# Patient Record
Sex: MALE | Race: WHITE | NOT HISPANIC OR LATINO | Employment: UNEMPLOYED | URBAN - METROPOLITAN AREA
[De-identification: names, ages, dates, MRNs, and addresses within clinical notes are randomized per-mention and may not be internally consistent; named-entity substitution may affect disease eponyms.]

---

## 2023-10-06 ENCOUNTER — LAB VISIT (OUTPATIENT)
Dept: LAB | Facility: HOSPITAL | Age: 28
End: 2023-10-06
Attending: STUDENT IN AN ORGANIZED HEALTH CARE EDUCATION/TRAINING PROGRAM
Payer: COMMERCIAL

## 2023-10-06 ENCOUNTER — OFFICE VISIT (OUTPATIENT)
Dept: HEMATOLOGY/ONCOLOGY | Facility: CLINIC | Age: 28
End: 2023-10-06
Payer: COMMERCIAL

## 2023-10-06 VITALS
HEIGHT: 69 IN | SYSTOLIC BLOOD PRESSURE: 136 MMHG | WEIGHT: 199.06 LBS | DIASTOLIC BLOOD PRESSURE: 73 MMHG | OXYGEN SATURATION: 98 % | BODY MASS INDEX: 29.48 KG/M2 | HEART RATE: 71 BPM

## 2023-10-06 DIAGNOSIS — E66.3 OVERWEIGHT (BMI 25.0-29.9): ICD-10-CM

## 2023-10-06 DIAGNOSIS — Z76.89 ENCOUNTER TO ESTABLISH CARE: ICD-10-CM

## 2023-10-06 DIAGNOSIS — D64.9 ANEMIA, UNSPECIFIED TYPE: ICD-10-CM

## 2023-10-06 DIAGNOSIS — D69.6 THROMBOCYTOPENIA: ICD-10-CM

## 2023-10-06 DIAGNOSIS — D69.6 THROMBOCYTOPENIA: Primary | ICD-10-CM

## 2023-10-06 LAB
BASOPHILS # BLD AUTO: 0.03 K/UL (ref 0–0.2)
BASOPHILS NFR BLD: 0.5 % (ref 0–1.9)
DIFFERENTIAL METHOD: ABNORMAL
EOSINOPHIL # BLD AUTO: 0 K/UL (ref 0–0.5)
EOSINOPHIL NFR BLD: 0.7 % (ref 0–8)
ERYTHROCYTE [DISTWIDTH] IN BLOOD BY AUTOMATED COUNT: 12.8 % (ref 11.5–14.5)
ERYTHROCYTE [SEDIMENTATION RATE] IN BLOOD BY WESTERGREN METHOD: 11 MM/HR (ref 0–10)
HCT VFR BLD AUTO: 40.3 % (ref 40–54)
HGB BLD-MCNC: 13.4 G/DL (ref 14–18)
IMM GRANULOCYTES # BLD AUTO: 0.02 K/UL (ref 0–0.04)
IMM GRANULOCYTES NFR BLD AUTO: 0.3 % (ref 0–0.5)
LYMPHOCYTES # BLD AUTO: 1.9 K/UL (ref 1–4.8)
LYMPHOCYTES NFR BLD: 32.3 % (ref 18–48)
MCH RBC QN AUTO: 28.3 PG (ref 27–31)
MCHC RBC AUTO-ENTMCNC: 33.3 G/DL (ref 32–36)
MCV RBC AUTO: 85 FL (ref 82–98)
MONOCYTES # BLD AUTO: 0.4 K/UL (ref 0.3–1)
MONOCYTES NFR BLD: 6.6 % (ref 4–15)
NEUTROPHILS # BLD AUTO: 3.5 K/UL (ref 1.8–7.7)
NEUTROPHILS NFR BLD: 59.6 % (ref 38–73)
NRBC BLD-RTO: 0 /100 WBC
PLATELET # BLD AUTO: 127 K/UL (ref 150–450)
PMV BLD AUTO: 12.7 FL (ref 9.2–12.9)
RBC # BLD AUTO: 4.73 M/UL (ref 4.6–6.2)
RETICS/RBC NFR AUTO: 1.1 % (ref 0.4–2)
WBC # BLD AUTO: 5.91 K/UL (ref 3.9–12.7)

## 2023-10-06 PROCEDURE — 99999 PR PBB SHADOW E&M-EST. PATIENT-LVL IV: ICD-10-PCS | Mod: PBBFAC,,, | Performed by: STUDENT IN AN ORGANIZED HEALTH CARE EDUCATION/TRAINING PROGRAM

## 2023-10-06 PROCEDURE — 84630 ASSAY OF ZINC: CPT | Performed by: STUDENT IN AN ORGANIZED HEALTH CARE EDUCATION/TRAINING PROGRAM

## 2023-10-06 PROCEDURE — 85025 COMPLETE CBC W/AUTO DIFF WBC: CPT | Performed by: STUDENT IN AN ORGANIZED HEALTH CARE EDUCATION/TRAINING PROGRAM

## 2023-10-06 PROCEDURE — 3008F PR BODY MASS INDEX (BMI) DOCUMENTED: ICD-10-PCS | Mod: CPTII,S$GLB,, | Performed by: STUDENT IN AN ORGANIZED HEALTH CARE EDUCATION/TRAINING PROGRAM

## 2023-10-06 PROCEDURE — 3078F PR MOST RECENT DIASTOLIC BLOOD PRESSURE < 80 MM HG: ICD-10-PCS | Mod: CPTII,S$GLB,, | Performed by: STUDENT IN AN ORGANIZED HEALTH CARE EDUCATION/TRAINING PROGRAM

## 2023-10-06 PROCEDURE — 83020 HEMOGLOBIN ELECTROPHORESIS: CPT | Performed by: STUDENT IN AN ORGANIZED HEALTH CARE EDUCATION/TRAINING PROGRAM

## 2023-10-06 PROCEDURE — 1159F PR MEDICATION LIST DOCUMENTED IN MEDICAL RECORD: ICD-10-PCS | Mod: CPTII,S$GLB,, | Performed by: STUDENT IN AN ORGANIZED HEALTH CARE EDUCATION/TRAINING PROGRAM

## 2023-10-06 PROCEDURE — 1159F MED LIST DOCD IN RCRD: CPT | Mod: CPTII,S$GLB,, | Performed by: STUDENT IN AN ORGANIZED HEALTH CARE EDUCATION/TRAINING PROGRAM

## 2023-10-06 PROCEDURE — 99999 PR PBB SHADOW E&M-EST. PATIENT-LVL IV: CPT | Mod: PBBFAC,,, | Performed by: STUDENT IN AN ORGANIZED HEALTH CARE EDUCATION/TRAINING PROGRAM

## 2023-10-06 PROCEDURE — 87340 HEPATITIS B SURFACE AG IA: CPT | Performed by: STUDENT IN AN ORGANIZED HEALTH CARE EDUCATION/TRAINING PROGRAM

## 2023-10-06 PROCEDURE — 3075F PR MOST RECENT SYSTOLIC BLOOD PRESS GE 130-139MM HG: ICD-10-PCS | Mod: CPTII,S$GLB,, | Performed by: STUDENT IN AN ORGANIZED HEALTH CARE EDUCATION/TRAINING PROGRAM

## 2023-10-06 PROCEDURE — 85045 AUTOMATED RETICULOCYTE COUNT: CPT | Performed by: STUDENT IN AN ORGANIZED HEALTH CARE EDUCATION/TRAINING PROGRAM

## 2023-10-06 PROCEDURE — 3078F DIAST BP <80 MM HG: CPT | Mod: CPTII,S$GLB,, | Performed by: STUDENT IN AN ORGANIZED HEALTH CARE EDUCATION/TRAINING PROGRAM

## 2023-10-06 PROCEDURE — 81269 HBA1/HBA2 GENE DUP/DEL VRNTS: CPT | Performed by: STUDENT IN AN ORGANIZED HEALTH CARE EDUCATION/TRAINING PROGRAM

## 2023-10-06 PROCEDURE — 85652 RBC SED RATE AUTOMATED: CPT | Performed by: STUDENT IN AN ORGANIZED HEALTH CARE EDUCATION/TRAINING PROGRAM

## 2023-10-06 PROCEDURE — 99205 OFFICE O/P NEW HI 60 MIN: CPT | Mod: S$GLB,,, | Performed by: STUDENT IN AN ORGANIZED HEALTH CARE EDUCATION/TRAINING PROGRAM

## 2023-10-06 PROCEDURE — 3075F SYST BP GE 130 - 139MM HG: CPT | Mod: CPTII,S$GLB,, | Performed by: STUDENT IN AN ORGANIZED HEALTH CARE EDUCATION/TRAINING PROGRAM

## 2023-10-06 PROCEDURE — 99205 PR OFFICE/OUTPT VISIT, NEW, LEVL V, 60-74 MIN: ICD-10-PCS | Mod: S$GLB,,, | Performed by: STUDENT IN AN ORGANIZED HEALTH CARE EDUCATION/TRAINING PROGRAM

## 2023-10-06 PROCEDURE — 83921 ORGANIC ACID SINGLE QUANT: CPT | Performed by: STUDENT IN AN ORGANIZED HEALTH CARE EDUCATION/TRAINING PROGRAM

## 2023-10-06 PROCEDURE — 82525 ASSAY OF COPPER: CPT | Performed by: STUDENT IN AN ORGANIZED HEALTH CARE EDUCATION/TRAINING PROGRAM

## 2023-10-06 PROCEDURE — 3008F BODY MASS INDEX DOCD: CPT | Mod: CPTII,S$GLB,, | Performed by: STUDENT IN AN ORGANIZED HEALTH CARE EDUCATION/TRAINING PROGRAM

## 2023-10-06 PROCEDURE — 86704 HEP B CORE ANTIBODY TOTAL: CPT | Performed by: STUDENT IN AN ORGANIZED HEALTH CARE EDUCATION/TRAINING PROGRAM

## 2023-10-06 NOTE — Clinical Note
-Please get all the labs I ordered on way out -Please get abd us week prior to MD visit -RTC in 1 month for MD virtual visit

## 2023-10-06 NOTE — PROGRESS NOTES
Hematology- Oncology Clinic Note :     10/6/2023    Chief Complaint   Patient presents with    Thrombocytopenia    Anemia    Establish Care         HPI  Pt is a 27 y.o. male with no significant medical history apart from overweight who presents to establish care in heme clinic for thrombocytopenia.  Plts noted to be 88 in august and have recovered to the 120s as of recent CBC.  Viral labs with hep C and BIV negative at that time.  Per pt he has no fmhx or past hx of heme issues and denied easy bleeding or bruising.  Workup is needed as diff is wide at this point and can include fatty liver, ITP or viral infection.  Pt agreeable to further workup including nutritional labs and abdominal US ordered.          Pmhx: as above  Fmhx: Aunt had cancer (unknown)  Social hx: denies smoking, ETOH or drug use  Surgical hx: As above        There are no problems to display for this patient.      There are no problems to display for this patient.    No past medical history on file.   No past surgical history on file.   (Not in a hospital admission)    Review of patient's allergies indicates:   Allergen Reactions    Pcn [penicillins] Anaphylaxis      Social History     Tobacco Use    Smoking status: Every Day    Smokeless tobacco: Never   Substance Use Topics    Alcohol use: No      Family History   Problem Relation Age of Onset    No Known Problems Mother     No Known Problems Father         Review of Systems :  Review of Systems   Constitutional:  Negative for fever, malaise/fatigue and weight loss.   HENT:  Negative for congestion, hearing loss, nosebleeds and sinus pain.    Eyes:  Negative for blurred vision and discharge.   Respiratory:  Negative for cough and shortness of breath.    Cardiovascular:  Negative for chest pain and leg swelling.   Gastrointestinal:  Negative for abdominal pain, blood in stool, constipation, diarrhea, heartburn, melena, nausea and vomiting.   Genitourinary:  Negative for dysuria and hematuria.    Musculoskeletal:  Negative for back pain, joint pain and myalgias.   Skin:  Negative for itching and rash.   Neurological:  Negative for dizziness.   Endo/Heme/Allergies:  Does not bruise/bleed easily.   Psychiatric/Behavioral:  Negative for depression. The patient is not nervous/anxious.        Physical Exam :  Wt Readings from Last 3 Encounters:   10/06/23 90.3 kg (199 lb 1.2 oz)   02/15/16 98.4 kg (217 lb)     Temp Readings from Last 3 Encounters:   02/15/16 97.4 °F (36.3 °C) (Oral)     BP Readings from Last 3 Encounters:   10/06/23 136/73   02/15/16 123/60     Pulse Readings from Last 3 Encounters:   10/06/23 71   02/15/16 69     Body mass index is 29.4 kg/m².    Physical Exam  Vitals reviewed.   HENT:      Head: Normocephalic and atraumatic.      Nose: Nose normal.      Mouth/Throat:      Mouth: Mucous membranes are moist.   Eyes:      Pupils: Pupils are equal, round, and reactive to light.   Cardiovascular:      Rate and Rhythm: Normal rate and regular rhythm.      Heart sounds: Normal heart sounds.   Pulmonary:      Effort: Pulmonary effort is normal.      Breath sounds: Normal breath sounds.   Abdominal:      General: Abdomen is flat.   Musculoskeletal:         General: Normal range of motion.      Cervical back: Normal range of motion.   Skin:     General: Skin is warm and dry.   Neurological:      Mental Status: He is alert. Mental status is at baseline.   Psychiatric:         Mood and Affect: Mood normal.         Behavior: Behavior normal.           Pertinent Diagnostic studies:    No results found for this or any previous visit (from the past 24 hour(s)).    Assessment/Plan :         Thrombocytopenia  -chronic, outside record in media, plts improved to 128 on recent labs  -per outside records (in media) plts 88 in August 2023, hep C and HIV negative, tsh wnl, A1C 4.3  -Pathologist interpretation of peripheral blood smear: White cells show occasional atypical lymphocytes, favor reactive in   the overall  spectrum.   Red cells and platelets show no diagnostic morphologic abnormalities on scanning.   -Per pt he has no fmhx or past hx of heme issues and denied easy bleeding or bruising.  Workup is needed as diff is wide at this point and can include fatty liver, ITP or viral infection.  Pt agreeable to further workup including nutritional labs and abdominal US ordered.  -RTC in 1 month for MD virtual visit      Overweight  -BMI 29  -Diet and Weight loss advised        Time spent on case: 45 minutes      Summary of orders placed this encounter:  Orders Placed This Encounter   Procedures    US Abdomen Complete    Hepatitis B Surface Antigen    Hepatitis B Core Antibody, Total    HEPATITIS B CORE ANTIBODY, TOTAL    COPPER, SERUM    ZINC    RETICULOCYTES    METHYLMALONIC ACID, SERUM    CBC W/ AUTO DIFFERENTIAL    Misc Sendout Test, Blood immature platelet fraction    Sedimentation rate    Alpha-Globin Gene Analysis    HEMOGLOBIN ELECTROPHORESIS,HGB A2 KE.       Future Appointments   Date Time Provider Department Center   10/6/2023  3:00 PM LAB, [facility] LAB [facility] [place]   11/6/2023 11:00 AM [facility] US ROOM 4 [facility] ULSOUND [facility] [place]   11/13/2023  2:00 PM Duran Goetz MD [facility] HEM ONC [facility] [place] [facility]           Duran Goetz MD   Hematology/oncology, [facility]

## 2023-10-07 LAB
HBV CORE AB SERPL QL IA: NORMAL
HBV CORE AB SERPL QL IA: NORMAL
HBV SURFACE AG SERPL QL IA: NORMAL

## 2023-10-10 LAB
HGB A2 MFR BLD HPLC: 2.5 % (ref 2.2–3.2)
HGB FRACT BLD ELPH-IMP: NORMAL
HGB FRACT BLD ELPH-IMP: NORMAL

## 2023-10-11 LAB — ZINC SERPL-MCNC: 62 UG/DL (ref 60–130)

## 2023-10-12 LAB
COPPER SERPL-MCNC: 896 UG/L (ref 665–1480)
METHYLMALONATE SERPL-SCNC: 0.13 UMOL/L

## 2023-10-18 LAB
ALPHA GLOBIN RELEASED BY: NORMAL
ALPHA-GLOBIN SPECIMEN: NORMAL
GENETICIST REVIEW: NORMAL
HBA1 GENE MUT ANL BLD/T: NEGATIVE
HBA1 GENE MUT ANL BLD/T: NORMAL
REF LAB TEST METHOD: NORMAL
SERVICE CMNT-IMP: NORMAL
SPECIMEN SOURCE: NORMAL

## 2023-11-13 ENCOUNTER — OFFICE VISIT (OUTPATIENT)
Dept: HEMATOLOGY/ONCOLOGY | Facility: CLINIC | Age: 28
End: 2023-11-13
Payer: COMMERCIAL

## 2023-11-13 DIAGNOSIS — E66.3 OVERWEIGHT (BMI 25.0-29.9): ICD-10-CM

## 2023-11-13 DIAGNOSIS — D64.9 ANEMIA, UNSPECIFIED TYPE: ICD-10-CM

## 2023-11-13 DIAGNOSIS — D69.6 THROMBOCYTOPENIA: Primary | ICD-10-CM

## 2023-11-13 PROCEDURE — 99214 OFFICE O/P EST MOD 30 MIN: CPT | Mod: 95,,, | Performed by: STUDENT IN AN ORGANIZED HEALTH CARE EDUCATION/TRAINING PROGRAM

## 2023-11-13 PROCEDURE — 99214 PR OFFICE/OUTPT VISIT, EST, LEVL IV, 30-39 MIN: ICD-10-PCS | Mod: 95,,, | Performed by: STUDENT IN AN ORGANIZED HEALTH CARE EDUCATION/TRAINING PROGRAM

## 2023-11-13 NOTE — PROGRESS NOTES
Data: [facility]: 2022  6:08 AM.  Reason for maternal/fetal assessment is leaking vaginal fluid. Patient reports waking up with vaginal pressure. Patient reports waking  and going to the bathroom and she states she could not stop peeing. No other leaking noted.  Patient is a .  Prenatal record reviewed. Pregnancy has been uncomplicated..  Gestational Age 37w4d. VSS. Fetal movement active. Patient denies uterine contractions, headache, epigastric pain. Support person is not present.   Action: Verbal consent for EFM. Triage assessment completed. Bill of rights reviewed.  Response: Patient verbalized agreement with plan. Will contact Dr Lani Carlos with update and for further orders.    Hematology- Oncology Clinic Note :     11/13/2023    No chief complaint on file.        HPI  Pt is a 28 y.o. male with no significant medical history apart from overweight who presents to establish care in heme clinic for thrombocytopenia.  Plts noted to be 88 in august and have recovered to the 120s as of recent CBC.  Viral labs with hep C and BIV negative at that time.  Per pt he has no fmhx or past hx of heme issues and denied easy bleeding or bruising.  Workup is needed as diff is wide at this point and can include fatty liver, ITP or viral infection.  Pt agreeable to further workup including nutritional labs and abdominal US ordered.          Pmhx: as above  Fmhx: Aunt had cancer (unknown)  Social hx: denies smoking, ETOH or drug use  Surgical hx: As above        Active Problem List with Overview Notes    Diagnosis Date Noted    Thrombocytopenia 10/06/2023    Overweight (BMI 25.0-29.9) 10/06/2023       Patient Active Problem List    Diagnosis Date Noted    Thrombocytopenia 10/06/2023    Overweight (BMI 25.0-29.9) 10/06/2023     No past medical history on file.   No past surgical history on file.   (Not in a hospital admission)    Review of patient's allergies indicates:   Allergen Reactions    Pcn [penicillins] Anaphylaxis      Social History     Tobacco Use    Smoking status: Every Day    Smokeless tobacco: Never   Substance Use Topics    Alcohol use: No      Family History   Problem Relation Age of Onset    No Known Problems Mother     No Known Problems Father         Review of Systems :  Review of Systems   Constitutional:  Negative for fever, malaise/fatigue and weight loss.   HENT:  Negative for congestion, hearing loss, nosebleeds and sinus pain.    Eyes:  Negative for blurred vision and discharge.   Respiratory:  Negative for cough and shortness of breath.    Cardiovascular:  Negative for chest pain and leg swelling.   Gastrointestinal:  Negative for abdominal pain, blood in stool, constipation,  diarrhea, heartburn, melena, nausea and vomiting.   Genitourinary:  Negative for dysuria and hematuria.   Musculoskeletal:  Negative for back pain, joint pain and myalgias.   Skin:  Negative for itching and rash.   Neurological:  Negative for dizziness.   Endo/Heme/Allergies:  Does not bruise/bleed easily.   Psychiatric/Behavioral:  Negative for depression. The patient is not nervous/anxious.        Physical Exam :  Wt Readings from Last 3 Encounters:   10/06/23 90.3 kg (199 lb 1.2 oz)   02/15/16 98.4 kg (217 lb)     Temp Readings from Last 3 Encounters:   02/15/16 97.4 °F (36.3 °C) (Oral)     BP Readings from Last 3 Encounters:   10/06/23 136/73   02/15/16 123/60     Pulse Readings from Last 3 Encounters:   10/06/23 71   02/15/16 69     There is no height or weight on file to calculate BMI.    Physical Exam  Vitals reviewed.   HENT:      Head: Normocephalic and atraumatic.      Nose: Nose normal.      Mouth/Throat:      Mouth: Mucous membranes are moist.   Eyes:      Pupils: Pupils are equal, round, and reactive to light.   Cardiovascular:      Rate and Rhythm: Normal rate and regular rhythm.      Heart sounds: Normal heart sounds.   Pulmonary:      Effort: Pulmonary effort is normal.      Breath sounds: Normal breath sounds.   Abdominal:      General: Abdomen is flat.   Musculoskeletal:         General: Normal range of motion.      Cervical back: Normal range of motion.   Skin:     General: Skin is warm and dry.   Neurological:      Mental Status: He is alert. Mental status is at baseline.   Psychiatric:         Mood and Affect: Mood normal.         Behavior: Behavior normal.           Pertinent Diagnostic studies:    No results found for this or any previous visit (from the past 24 hour(s)).    Assessment/Plan :         Thrombocytopenia  -chronic, outside record in media, plts improved to 128 on recent labs  -per outside records (in media) plts 88 in August 2023, hep C and HIV negative, tsh wnl, A1C  4.3  -Pathologist interpretation of peripheral blood smear: White cells show occasional atypical lymphocytes, favor reactive in   the overall spectrum.   Red cells and platelets show no diagnostic morphologic abnormalities on scanning.   -Per pt he has no fmhx or past hx of heme issues and denied easy bleeding or bruising.    -diff is wide at this point and can include fatty liver, ITP or it could be that pt normally runs low or recovering from a viral illness   -Hepatitis negative and vitamins wnl  -US demonstrates mild splenomegaly and mild anemia, rest of workup unremarkable  -RTC in 4 months for MD virtual visit with CBC, iron and ferritin week prior    Overweight  -BMI 29  -Diet and Weight loss advised        Time spent on case: 30 minutes      Summary of orders placed this encounter:  No orders of the defined types were placed in this encounter.      Future Appointments   Date Time Provider Department Center   11/13/2023  2:00 PM Duran Goetz MD [facility] HEM ONC [facility] Cl           Duran Goetz MD   Hematology/oncology, [facility]

## 2024-03-05 ENCOUNTER — TELEPHONE (OUTPATIENT)
Dept: HEMATOLOGY/ONCOLOGY | Facility: CLINIC | Age: 29
End: 2024-03-05
Payer: COMMERCIAL

## 2024-03-05 NOTE — TELEPHONE ENCOUNTER
----- Message from Homer Olivares sent at 3/5/2024 11:24 AM CST -----  Regarding: health  Type: Patient Call Back    Who called:health    What is the request in detail:looking for lab orders for the mutual patient     Can the clinic reply by [facility]?no    Would the patient rather a call back or a response via [facility]? callback    Best call back number:[phone]    Additional Information:

## 2024-03-05 NOTE — TELEPHONE ENCOUNTER
Returned call and spoke with Zenaida, she requested that the orders for patients BW be faxed so they can draw them.   Faxed orders to